# Patient Record
Sex: FEMALE | Race: WHITE
[De-identification: names, ages, dates, MRNs, and addresses within clinical notes are randomized per-mention and may not be internally consistent; named-entity substitution may affect disease eponyms.]

---

## 2024-06-25 ENCOUNTER — HOSPITAL ENCOUNTER (OUTPATIENT)
Dept: PHYSICAL THERAPY | Facility: CLINIC | Age: 44
Setting detail: THERAPIES SERIES
Discharge: HOME | End: 2024-06-25
Payer: COMMERCIAL

## 2024-06-25 PROCEDURE — 97161 PT EVAL LOW COMPLEX 20 MIN: CPT

## 2024-06-25 PROCEDURE — 97530 THERAPEUTIC ACTIVITIES: CPT

## 2024-06-25 NOTE — CONSULTS
incontinence  -Educated patient on stress urinary incontinence  -Educated patient on how to complete pelvic floor activation    -Educated patient on the knack technique      Specific Instructions for next treatment: Progress PF relaxation and strength, Initiated core stability ex    Evaluation Complexity:  History (Personal factors, comorbidities) [x] 0 [] 1-2 [] 3+   Exam (limitations, restrictions) [x] 1-2 [] 3 [] 4+   Clinical presentation (progression) [x] Stable [] Evolving  [] Unstable   Decision Making [x] Low [] Moderate [] High    [x] Low Complexity [] Moderate Complexity [] High Complexity       Treatment Charges: Mins Units   [x] Evaluation       [x]  Low       []  Moderate       []  High 34 1   []  Modalities:     []  Ther Exercise     []  Manual Therapy     [x]  Ther Activities 15 1   []  Aquatics     [] Vasocompression     []  Other       TOTAL TREATMENT TIME: 49 min      Time in: 11:11 am      Time out: 12:00 pm       Electronically signed by: Paola Schmitt PT      Physician Signature:________________________________Date:__________________  By signing above or cosigning this note, I have reviewed this plan of care and certify a need for medically necessary rehabilitation services.     *PLEASE SIGN ABOVE AND FAX BACK ALL PAGES*

## 2024-07-03 ENCOUNTER — HOSPITAL ENCOUNTER (OUTPATIENT)
Dept: PHYSICAL THERAPY | Facility: CLINIC | Age: 44
Setting detail: THERAPIES SERIES
Discharge: HOME | End: 2024-07-03
Payer: COMMERCIAL

## 2024-07-03 PROCEDURE — 97110 THERAPEUTIC EXERCISES: CPT

## 2024-07-03 PROCEDURE — 97530 THERAPEUTIC ACTIVITIES: CPT

## 2024-07-03 NOTE — FLOWSHEET NOTE
[] [facility]  Outpatient Rehabilitation &  Therapy  [address]  [phone]  [phone] [] [facility]  Outpatient Rehabilitation &  Therapy  [address]  P: [phone]  F: [phone] [x] [facility]  Outpatient Rehabilitation &  Therapy  [address]  P: [phone]  F: [phone] [] [facility]  Outpatient Rehabilitation &  Therapy  [address]  [phone]  [phone] [] [facility]  Outpatient Rehabilitation &  Therapy  [address]   P: [phone]  F: [phone]  [] [facility]  Outpatient Rehabilitation &  Therapy  [address]  P: [phone]  F: [phone] [] [facility]  Outpatient Rehabilitation &  Therapy  [address]  P: [phone]  F: [phone] [] [facility]  Outpatient Rehabilitation &  Therapy  [address]  P: [phone]  F: [phone] [] [facility]  Outpatient Rehabilitation &  Therapy  [address]  P: [phone]  F: [phone]  [] [facility] Outpatient Rehabilitation &  Therapy  [address]  P: [phone]  F: [phone]     Physical Therapy Daily Treatment Note    Date:  7/3/2024  Patient Name:  Mary Benson    :  1980  MRN: [id number]  Physician: Lee Ann Michelle MD                      Insurance:  [facility] [employer]: [facility]  vs hard max, not combined; Copay $50/ vs; no ded or coins; oop 9400/ 9352 remain, AUTH AFTER EVAL    Approved visits valid: 24-9/15/24  CPT Codes: 91235, 96552, 13493 1X/wk (10visits/40units); Auth#[id number]      Medical Diagnosis: N39.3 (ICD-10-CM) - Stress incontinence (female) (male)                             Rehab Codes: N81.84, R27.8, M62.81, M62.08, N39.3    Onset Date:  24 (script)

## 2024-07-10 ENCOUNTER — HOSPITAL ENCOUNTER (OUTPATIENT)
Dept: PHYSICAL THERAPY | Facility: CLINIC | Age: 44
Setting detail: THERAPIES SERIES
Discharge: HOME | End: 2024-07-10
Payer: COMMERCIAL

## 2024-07-10 PROCEDURE — 97110 THERAPEUTIC EXERCISES: CPT

## 2024-07-10 NOTE — FLOWSHEET NOTE
Next Dr.'s appt: PRN  Visit# / total visits: 3/10;      Cancels/No Shows: 0/0    Subjective:    Pain:  [] Yes  [x] No Location:  N/A Pain Rating: (0-10 scale) 0/10  Pain altered Tx:  [x] No  [] Yes  Action:  Comments: Patient reports to physical therapy this date stating that she has been practicing pelvic floor activation at home. States that she has noticed a reduction in ARMANDO with sneezing and coughing because she has been completing the knack technique.     Objective:  Modalities:   Precautions:  Exercises:  Exercise Reps/ Time Weight/ Level Comments   Piriformis S  30\"x3       Hip flexor S  30\"x3                 Bridges   2x10  lime     Clamshells  2x10  lime     SL hip abduction   2x10  lime               TA set   3\"x15       PF endurance holds  3\"x15       PF quick flicks   1\"x15        PF LAQ  x10ea       PF ball squeeze 3\"x15     PF STS  x10                 Other:     Treatment Charges: Mins Units   []  Modalities     [x]  Ther Exercise 45 3   []  Manual Therapy     []  Ther Activities     []  Neuro Re-ed     []  Vasocompression     [] Gait     []  Other     Total Billable time 45 3       Assessment: [x] Progressing toward goals. Initiated session with stretches in order to progress mobility. Started strengthening with pelvic floor strengthening ex per patient preference in order to reduce ARMANDO symptoms. Initiated seated pelvic floor strengthening this date. VC throughout to improve breathing technique and reduce glute activation with good follow through. Patient demonstrates increase glute activation with STS this date, however patient able to reduce co-activation with increased reps. Ended session with TA and hip strengthening to improve support around pelvic floor.     [] No change.     [] Other:  [x] Patient would continue to benefit from skilled physical therapy services in order to: improve  diastasis recti, pelvic floor strength, pelvic floor coordination and (B) LE strength     STG: (to be met

## 2024-07-16 ENCOUNTER — HOSPITAL ENCOUNTER (OUTPATIENT)
Dept: PHYSICAL THERAPY | Facility: CLINIC | Age: 44
Setting detail: THERAPIES SERIES
Discharge: HOME | End: 2024-07-16
Payer: COMMERCIAL

## 2024-07-16 PROCEDURE — 97110 THERAPEUTIC EXERCISES: CPT

## 2024-07-16 NOTE — FLOWSHEET NOTE
benefit from skilled physical therapy services in order to: improve  diastasis recti, pelvic floor strength, pelvic floor coordination and (B) LE strength     STG: (to be met in 4 treatments)  ? Strength: Patient will demonstrate proper TA/PF activation in supine with correct breathing in order to improve core/pelvic floor stability  ? Function: Patient will demonstrate 1 cm perineal body descent without use of accessory muscles in order to improve coordination of the pelvic floor   Patient will improve diastasis recti to 0 finger widths in order to improve co-contraction of pelvic floor and overall stability   Independent with Home Exercise Programs  LTG: (to be met in 8 treatments)  Patient will improve DAKOTA-6 to 0% in order to indicate improved overall quality of life   Patient will improve (B) LE strength to 5/5 in order to improve stability to pelvis    Patient will demonstrate ability to lift up to 25lbs with good core/pelvic floor stability and good breathing technique in order to increase ease of performing care taking tasks   Patient will report reduced ARMANDO symptoms with jumping and running with use of knack technique     Pt. Education:  [x] Yes  [] No  [x] Reviewed Prior HEP/Ed  Method of Education: [x] Verbal  [x] Demo  [] Written  Access Code: [email or web address]  URL: [email or web address]  Date: 07/16/2024  Prepared by: Paola Montgomery    Exercises  - Supine Piriformis Stretch with Foot on Ground  - 2 x daily - 7 x weekly - 3 sets - 30 sec hold  - Modified Artie Stretch  - 2 x daily - 7 x weekly - 3 sets - 30 sec hold  - Supine Bridge  - 2 x daily - 7 x weekly - 2 sets - 10 reps  - Clamshell  - 2 x daily - 7 x weekly - 2 sets - 10 reps  - Sidelying Hip Abduction  - 2 x daily - 7 x weekly - 2 sets - 10 reps  - Supine Transversus Abdominis Bracing - Hands on Stomach  - 2 x daily - 7 x weekly - 2 sets - 10 reps - 5 sec hold  - Seated Pelvic Floor Contraction  - 2 x daily - 7 x weekly - 1 sets - 15 reps - 3 sec

## 2024-07-26 ENCOUNTER — HOSPITAL ENCOUNTER (OUTPATIENT)
Dept: PHYSICAL THERAPY | Facility: CLINIC | Age: 44
Setting detail: THERAPIES SERIES
Discharge: HOME | End: 2024-07-26
Payer: COMMERCIAL

## 2024-07-26 PROCEDURE — 97110 THERAPEUTIC EXERCISES: CPT

## 2024-07-26 NOTE — FLOWSHEET NOTE
[] [facility]  Outpatient Rehabilitation &  Therapy  [address]  [phone]  [phone] [] [facility]  Outpatient Rehabilitation &  Therapy  [address]  P: [phone]  F: [phone] [x] [facility]  Outpatient Rehabilitation &  Therapy  [address]  P: [phone]  F: [phone] [] [facility]  Outpatient Rehabilitation &  Therapy  [address]  [phone]  [phone] [] [facility]  Outpatient Rehabilitation &  Therapy  [address]   P: [phone]  F: [phone]  [] [facility]  Outpatient Rehabilitation &  Therapy  [address]  P: [phone]  F: [phone] [] [facility]  Outpatient Rehabilitation &  Therapy  [address]  P: [phone]  F: [phone] [] [facility]  Outpatient Rehabilitation &  Therapy  [address]  P: [phone]  F: [phone] [] [facility]  Outpatient Rehabilitation &  Therapy  [address]  P: [phone]  F: [phone]  [] [facility] Outpatient Rehabilitation &  Therapy  [address]  P: [phone]  F: [phone]     Physical Therapy Daily Treatment Note    Date:  2024  Patient Name:  Mary Benson    :  1980  MRN: [id number]  Physician: Lee Ann Michelle MD                      Insurance:  [facility] Market[facility]: [facility]  vs hard max, not combined; Copay $50/ vs; no ded or coins; oop 9400/ 9357 remain, AUTH AFTER EVAL    Approved visits valid: 24-9/15/24  CPT Codes: 73763, 31083, 32167 1X/wk (10visits/40units); Auth#[id number]      Medical Diagnosis: N39.3 (ICD-10-CM) - Stress incontinence (female) (male)                             Rehab Codes: N81.84, R27.8, M62.81, M62.08, N39.3    Onset Date:  24 (script)

## 2024-08-06 ENCOUNTER — HOSPITAL ENCOUNTER (OUTPATIENT)
Dept: PHYSICAL THERAPY | Facility: CLINIC | Age: 44
Setting detail: THERAPIES SERIES
Discharge: HOME | End: 2024-08-06
Payer: COMMERCIAL

## 2024-08-06 PROCEDURE — 97110 THERAPEUTIC EXERCISES: CPT

## 2024-08-06 NOTE — FLOWSHEET NOTE
[] [facility]  Outpatient Rehabilitation &  Therapy  [address]  [phone]  [phone] [] [facility]  Outpatient Rehabilitation &  Therapy  [address]  P: [phone]  F: [phone] [x] [facility]  Outpatient Rehabilitation &  Therapy  [address]  P: [phone]  F: [phone] [] [facility]  Outpatient Rehabilitation &  Therapy  [address]  [phone]  [phone] [] [facility]  Outpatient Rehabilitation &  Therapy  [address]   P: [phone]  F: [phone]  [] [facility]  Outpatient Rehabilitation &  Therapy  [address]  P: [phone]  F: [phone] [] [facility]  Outpatient Rehabilitation &  Therapy  [address]  P: [phone]  F: [phone] [] [facility]  Outpatient Rehabilitation &  Therapy  [address]  P: [phone]  F: [phone] [] [facility]  Outpatient Rehabilitation &  Therapy  [address]  P: [phone]  F: [phone]  [] [facility] Outpatient Rehabilitation &  Therapy  [address]  P: [phone]  F: [phone]     Physical Therapy Daily Treatment Note    Date:  2024  Patient Name:  Mary Benson    :  1980  MRN: [id number]  Physician: Lee Ann Michelle MD                      Insurance:  [facility] Marketplace: [facility]  vs hard max, not combined; Copay $50/ vs; no ded or coins; oop 9400/ 9307 remain, AUTH AFTER EVAL    Approved visits valid: 24-9/15/24  CPT Codes: 68045, 44053, 83914 1X/wk (10visits/40units); Auth#[id number]      Medical Diagnosis: N39.3 (ICD-10-CM) - Stress incontinence (female) (male)                             Rehab Codes: N81.84, R27.8, M62.81, M62.08, N39.3    Onset Date:  24 (script)

## 2024-08-21 ENCOUNTER — HOSPITAL ENCOUNTER (OUTPATIENT)
Dept: PHYSICAL THERAPY | Facility: CLINIC | Age: 44
Setting detail: THERAPIES SERIES
Discharge: HOME | End: 2024-08-21
Payer: COMMERCIAL

## 2024-08-21 PROCEDURE — 97110 THERAPEUTIC EXERCISES: CPT

## 2024-08-21 NOTE — FLOWSHEET NOTE
Next Dr.'s appt: PRN  Visit# / total visits: 7/10;      Cancels/No Shows: 0/0    Subjective:    Pain:  [] Yes  [x] No Location:  N/A Pain Rating: (0-10 scale) 0/10  Pain altered Tx:  [x] No  [] Yes  Action:  Comments: Patient reports to physical therapy this date stating that she cont to see improvements since previous session. States that she has been completing ex regularly and has noticed that she has better control over pelvic floor with sneezing and coughing. Was able to swing a bat with less leakage than previously. States that she did have larger leakage yesterday following a sneeze when her bladder was full.     Objective:  Modalities:   Precautions: Pelvic Floor Ex First  Exercises:  Exercise Reps/ Time Weight/ Level Comments    Piriformis S  30\"x3     x   Hip flexor S  30\"x3     x              Bridges   3x10 blue      Clamshells  3x10  lime      SL hip abduction   3x10  lime                 TA set   3\"x15        PF endurance holds  3\"x15        PF quick flicks   1\"x15         PF LAQ  x10ea        PF ball squeeze 3\"x15      PF marches x10ea      PF STS  x10   x   PF endurance holds 3\"x15   x   PF quick flicks  1\"x15   x   PF fwd steps x20   x   PF lat steps x20   x   PF squats  x10   x   PF step ups  x10   x   Other:     Treatment Charges: Mins Units   []  Modalities     [x]  Ther Exercise 45 3   []  Manual Therapy     [x]  Ther Activities 5 -   []  Neuro Re-ed     []  Vasocompression     [] Gait     []  Other     Total Billable time 50 3       Assessment: [x] Progressing toward goals. Initiated session this date with pelvic floor activation ex with use of BFB device to improve activation of the pelvic floor and assist patient in understanding if she is completing pelvic floor activation correctly. Patient cont to demonstrate good initial contraction of pelvic floor, however with increased reps and ex, patient demonstrates increased fatigue and decrease pelvic floor activation. VC to provide ample

## 2024-09-04 ENCOUNTER — HOSPITAL ENCOUNTER (OUTPATIENT)
Dept: PHYSICAL THERAPY | Facility: CLINIC | Age: 44
Setting detail: THERAPIES SERIES
Discharge: HOME | End: 2024-09-04
Payer: COMMERCIAL

## 2024-09-04 PROCEDURE — 97110 THERAPEUTIC EXERCISES: CPT

## 2024-09-04 NOTE — FLOWSHEET NOTE
[] [facility]  Outpatient Rehabilitation &  Therapy  [address]  [phone]  [phone] [] [facility]  Outpatient Rehabilitation &  Therapy  [address]  P: [phone]  F: [phone] [x] [facility]  Outpatient Rehabilitation &  Therapy  [address]  P: [phone]  F: [phone] [] [facility]  Outpatient Rehabilitation &  Therapy  [address]  [phone]  [phone] [] [facility]  Outpatient Rehabilitation &  Therapy  [address]   P: [phone]  F: [phone]  [] [facility]  Outpatient Rehabilitation &  Therapy  [address]  P: [phone]  F: [phone] [] [facility]  Outpatient Rehabilitation &  Therapy  [address]  P: [phone]  F: [phone] [] [facility]  Outpatient Rehabilitation &  Therapy  [address]  P: [phone]  F: [phone] [] [facility]  Outpatient Rehabilitation &  Therapy  [address]  P: [phone]  F: [phone]  [] [facility] Outpatient Rehabilitation &  Therapy  [address]  P: [phone]  F: [phone]     Physical Therapy Daily Treatment Note    Date:  2024  Patient Name:  Mary Benson    :  1980  MRN: [id number]  Physician: Lee Ann Michelle MD                      Insurance:  [facility]: [facility]  vs hard max, not combined; Copay $50/ vs; no ded or coins; oop 9400/ 9335 remain, AUTH AFTER EVAL    Approved visits valid: 24-9/15/24   CPT Codes: 63616, 94463, 00471, 96884 (lacy'd ); 1X/wk (10visits/40units)       Medical Diagnosis: N39.3 (ICD-10-CM) - Stress incontinence (female) (male)                             Rehab Codes: N81.84, R27.8, M62.81, M62.08, N39.3    Onset Date:  24 (script)

## 2024-09-27 ENCOUNTER — HOSPITAL ENCOUNTER (OUTPATIENT)
Dept: PHYSICAL THERAPY | Facility: CLINIC | Age: 44
Setting detail: THERAPIES SERIES
Discharge: HOME | End: 2024-09-27
Payer: COMMERCIAL

## 2024-09-27 PROCEDURE — 97110 THERAPEUTIC EXERCISES: CPT

## 2024-10-09 ENCOUNTER — HOSPITAL ENCOUNTER (OUTPATIENT)
Dept: PHYSICAL THERAPY | Facility: CLINIC | Age: 44
Setting detail: THERAPIES SERIES
Discharge: HOME | End: 2024-10-09
Payer: COMMERCIAL

## 2024-10-09 PROCEDURE — 97530 THERAPEUTIC ACTIVITIES: CPT

## 2024-10-09 PROCEDURE — G0283 ELEC STIM OTHER THAN WOUND: HCPCS

## 2024-10-09 NOTE — PROGRESS NOTES
to lift up to 25lbs with good core/pelvic floor stability and good breathing technique in order to increase ease of performing care taking tasks (MET)   Patient will report reduced ARMANDO symptoms with jumping and running with use of knack technique Patient reports that her urinary incontinence happens very infrequently mostly with sneezes and straddle jumping, however patient reports that she has been working on ex to prevent leakage. Does feel she can hold her bladder longer. (ONGOING)     Pt. Education:  [x] Yes  [] No  [x] Reviewed Prior HEP/Ed  Method of Education: [x] Verbal  [x] Demo  [] Written  Access Code: [email or web address]  URL: [email or web address]  Date: 09/27/2024  Prepared by: Paola Montgomery    Exercises  - Supine Piriformis Stretch with Foot on Ground  - 1 x daily - 7 x weekly - 3 sets - 30 sec hold  - Modified Artie Stretch  - 1 x daily - 7 x weekly - 3 sets - 30 sec hold  - Supine Bridge  - 1 x daily - 7 x weekly - 3 sets - 10 reps  - Clamshell  - 1 x daily - 7 x weekly - 3 sets - 10 reps  - Sidelying Hip Abduction  - 1 x daily - 7 x weekly - 3 sets - 10 reps  - Sit to Stand with Pelvic Floor Contraction  - 1 x daily - 7 x weekly - 1 sets - 10 reps  - Standing Pelvic Floor Contraction  - 1 x daily - 7 x weekly - 1 sets - 15 reps - 3 sec hold  - Lunge with Pelvic Floor Contraction  - 1 x daily - 7 x weekly - 1 sets - 10 reps  - Standing Side Lunge With Pelvic Floor Contraction  - 1 x daily - 7 x weekly - 1 sets - 10 reps  - Mini Squat with Pelvic Floor Contraction  - 1 x daily - 7 x weekly - 1 sets - 10 reps  - Squat to Heel Raise  - 1 x daily - 7 x weekly - 2 sets - 10 reps  - Step Up with Pelvic Floor Contraction  - 1 x daily - 7 x weekly - 1 sets - 10 reps  - Jumping with Pelvic Floor Contraction  - 1 x daily - 7 x weekly - 1 sets - 10 reps  - Jumping Jacks  - 1 x daily - 7 x weekly - 1 sets - 10 reps  - Standing Hip Flexion with Resistance Loop  - 1 x daily - 7 x weekly - 1 sets - 20 reps  - Hip

## 2024-10-23 ENCOUNTER — HOSPITAL ENCOUNTER (OUTPATIENT)
Dept: PHYSICAL THERAPY | Facility: CLINIC | Age: 44
Setting detail: THERAPIES SERIES
Discharge: HOME | End: 2024-10-23
Payer: COMMERCIAL

## 2024-10-23 PROCEDURE — 97112 NEUROMUSCULAR REEDUCATION: CPT

## 2024-10-23 PROCEDURE — G0283 ELEC STIM OTHER THAN WOUND: HCPCS

## 2024-10-23 NOTE — FLOWSHEET NOTE
[] [facility]  Outpatient Rehabilitation &  Therapy  [address]  [phone]  [phone] [] [facility]  Outpatient Rehabilitation &  Therapy  [address]  P: [phone]  F: [phone] [x] [facility]  Outpatient Rehabilitation &  Therapy  [address]  P: [phone]  F: [phone] [] [facility]  Outpatient Rehabilitation &  Therapy  [address]  [phone]  [phone] [] [facility]  Outpatient Rehabilitation &  Therapy  [address]   P: [phone]  F: [phone]  [] [facility]  Outpatient Rehabilitation &  Therapy  [address]  P: [phone]  F: [phone] [] [facility]  Outpatient Rehabilitation &  Therapy  [address]  P: [phone]  F: [phone] [] [facility]  Outpatient Rehabilitation &  Therapy  [address]  P: [phone]  F: [phone] [] [facility]  Outpatient Rehabilitation &  Therapy  [address]  P: [phone]  F: [phone]  [] [facility] Outpatient Rehabilitation &  Therapy  [address]  P: [phone]  F: [phone]     Physical Therapy Daily Treatment Note    Date:  10/23/2024  Patient Name:  Mary Benson    :  1980  MRN: [id number]  Physician: Lee Ann Michelle MD                      Insurance:  [facility] Marketplace: [facility]  vs hard max, not combined; Copay $50/ vs; no ded or coins; oop 9400/ 9360 remain, AUTH AFTER EVAL    Approved visits valid: 24-9/15/24   CPT Codes: 20960, 95556, 29852, 17816 (lacy'd ); 1X/wk (10visits/40units)      [facility] valid 24 - 10/31/24; CPT Codes: 32446, 73398, 39389, 41244; Auth #:[id number]      Medical Diagnosis: N39.3 (ICD-10-CM) - Stress incontinence (female) (male)

## 2024-11-06 ENCOUNTER — HOSPITAL ENCOUNTER (OUTPATIENT)
Dept: PHYSICAL THERAPY | Facility: CLINIC | Age: 44
Setting detail: THERAPIES SERIES
Discharge: HOME | End: 2024-11-06
Payer: COMMERCIAL

## 2024-11-06 PROCEDURE — 97140 MANUAL THERAPY 1/> REGIONS: CPT

## 2024-11-06 PROCEDURE — 97110 THERAPEUTIC EXERCISES: CPT

## 2024-11-06 NOTE — FLOWSHEET NOTE
overall stability (MET)   Independent with Home Exercise Programs (MET)   LTG: (to be met in 8 treatments)  Patient will improve DAKOTA-6 to 0% in order to indicate improved overall quality of life Patient reports DAKOTA-6: 8.33% (ONGOING)   Patient will improve (B) LE strength to 5/5 in order to improve stability to pelvis (MET)    Patient will demonstrate ability to lift up to 25lbs with good core/pelvic floor stability and good breathing technique in order to increase ease of performing care taking tasks (MET)   Patient will report reduced ARMANDO symptoms with jumping and running with use of knack technique Patient reports that her urinary incontinence happens very infrequently mostly with sneezes and straddle jumping, however patient reports that she has been working on ex to prevent leakage. Does feel she can hold her bladder longer. (ONGOING)     Pt. Education:  [x] Yes  [] No  [x] Reviewed Prior HEP/Ed  Method of Education: [x] Verbal  [x] Demo  [] Written  Access Code: [email or web address]  URL: [email or web address]  Date: 11/06/2024  Prepared by: Paola Montgomery    Exercises  - Supine Butterfly Groin Stretch  - 2 x daily - 7 x weekly - 3 sets - 30 sec hold  - Supine Pelvic Floor Stretch  - 2 x daily - 7 x weekly - 3 sets - 30 sec hold  - Supine Piriformis Stretch with Foot on Ground  - 1 x daily - 7 x weekly - 3 sets - 30 sec hold  - Modified Artie Stretch  - 1 x daily - 7 x weekly - 3 sets - 30 sec hold  - Seated Pelvic Floor Elevators With Lengthening  - 1 x daily - 7 x weekly - 1 sets - 10 reps  - Standing Pelvic Floor Contraction  - 1 x daily - 7 x weekly - 1 sets - 15 reps - 3 sec hold  - Jumping Jacks  - 1 x daily - 7 x weekly - 1 sets - 10 reps  - Lateral Single Leg Lunge Jumps  - 1 x daily - 7 x weekly - 1 sets - 10 reps  - Skip with High Knees  - 1 x daily - 7 x weekly - 1 sets - 10 reps  - Forward Single Leg Jumps  - 1 x daily - 7 x weekly - 1 sets - 10 reps  Comprehension of Education:  [x] Verbalizes

## 2024-11-20 ENCOUNTER — HOSPITAL ENCOUNTER (OUTPATIENT)
Dept: PHYSICAL THERAPY | Facility: CLINIC | Age: 44
Setting detail: THERAPIES SERIES
Discharge: HOME | End: 2024-11-20
Payer: COMMERCIAL

## 2024-11-20 PROCEDURE — 97530 THERAPEUTIC ACTIVITIES: CPT

## 2024-11-20 PROCEDURE — 97110 THERAPEUTIC EXERCISES: CPT

## 2024-11-20 NOTE — FLOWSHEET NOTE
Education:  [x] Verbalizes understanding.  [x] Demonstrates understanding.  [x] Needs review.  [] Demonstrates/verbalizes HEP/Ed previously given.     Plan: [x] Patient to be placed on hold until the end of 2024. Will be discharged if no further intervention is required         Time In: 8:05 am            Time Out: 8:56 am     Electronically signed by:  Paola Schmitt, PT

## 2024-12-03 ENCOUNTER — CLINICAL DOCUMENTATION (OUTPATIENT)
Dept: PHYSICAL THERAPY | Facility: CLINIC | Age: 44
End: 2024-12-03

## 2024-12-03 NOTE — FLOWSHEET NOTE
Diagnosis: N39.3 (ICD-10-CM) - Stress incontinence (female) (male)                             Rehab Codes: N81.84, R27.8, M62.81, M62.08, N39.3    Onset Date:  6/13/24 (script)                        Next Dr.'s appt: PRN  Visit# / total visits: 13/14;   Date of initial visit: 6/25/24                Date of final visit: 11/20/24      Discharge Status:     [x] Pt recovered from conditions. Treatment goals were met.    [] Pt received maximum benefit. No further therapy indicated at this time.    [x] Pt to continue exercise/home instructions independently.    [] Therapy interrupted due to:    [] Pt has 2 or more no shows/cancels, is discontinued per our policy.    [] Pt has completed prescribed number of treatment sessions.    [] Other:         Electronically signed by Radha Hilton PTA on 12/3/2024 at 3:21 PM      If you have any questions or concerns, please don't hesitate to call.  Thank you for your referral.

## 2025-04-04 ENCOUNTER — HOSPITAL ENCOUNTER (EMERGENCY)
Age: 45
Discharge: HOME | End: 2025-04-04
Attending: EMERGENCY MEDICINE
Payer: COMMERCIAL

## 2025-04-04 ENCOUNTER — APPOINTMENT (OUTPATIENT)
Dept: CT IMAGING | Age: 45
End: 2025-04-04
Payer: COMMERCIAL

## 2025-04-04 VITALS
SYSTOLIC BLOOD PRESSURE: 90 MMHG | DIASTOLIC BLOOD PRESSURE: 57 MMHG | TEMPERATURE: 99.1 F | BODY MASS INDEX: 22.45 KG/M2 | HEART RATE: 84 BPM | WEIGHT: 122 LBS | OXYGEN SATURATION: 97 % | HEIGHT: 62 IN | RESPIRATION RATE: 16 BRPM

## 2025-04-04 DIAGNOSIS — R10.33 PERIUMBILICAL ABDOMINAL PAIN: Primary | ICD-10-CM

## 2025-04-04 DIAGNOSIS — R19.7 NAUSEA VOMITING AND DIARRHEA: ICD-10-CM

## 2025-04-04 DIAGNOSIS — R11.2 NAUSEA VOMITING AND DIARRHEA: ICD-10-CM

## 2025-04-04 LAB
ALBUMIN SERPL-MCNC: 4.3 G/DL (ref 3.5–5.2)
ALBUMIN/GLOB SERPL: 1.5 {RATIO} (ref 1–2.5)
ALP SERPL-CCNC: 59 U/L (ref 35–104)
ALT SERPL-CCNC: 13 U/L (ref 5–33)
ANION GAP SERPL CALCULATED.3IONS-SCNC: 11 MMOL/L (ref 9–17)
AST SERPL-CCNC: 16 U/L
BASOPHILS # BLD: 0 K/UL (ref 0–0.2)
BASOPHILS NFR BLD: 0 % (ref 0–2)
BILIRUB SERPL-MCNC: 0.7 MG/DL (ref 0.3–1.2)
BILIRUB UR QL STRIP: NEGATIVE
BUN SERPL-MCNC: 15 MG/DL (ref 6–20)
CALCIUM SERPL-MCNC: 9 MG/DL (ref 8.6–10.4)
CHLORIDE SERPL-SCNC: 103 MMOL/L (ref 98–107)
CLARITY UR: CLEAR
CO2 SERPL-SCNC: 26 MMOL/L (ref 20–31)
COLOR UR: YELLOW
COMMENT: ABNORMAL
CREAT SERPL-MCNC: 0.6 MG/DL (ref 0.5–0.9)
EOSINOPHIL # BLD: 0 K/UL (ref 0–0.4)
EOSINOPHILS RELATIVE PERCENT: 0 % (ref 1–4)
ERYTHROCYTE [DISTWIDTH] IN BLOOD BY AUTOMATED COUNT: 12.1 % (ref 12.5–15.4)
GFR, ESTIMATED: >90 ML/MIN/1.73M2
GLUCOSE SERPL-MCNC: 103 MG/DL (ref 70–99)
GLUCOSE UR STRIP-MCNC: NEGATIVE MG/DL
HCG SERPL QL: NEGATIVE
HCT VFR BLD AUTO: 44.5 % (ref 36–46)
HGB BLD-MCNC: 15.8 G/DL (ref 12–16)
HGB UR QL STRIP.AUTO: NEGATIVE
KETONES UR STRIP-MCNC: ABNORMAL MG/DL
LEUKOCYTE ESTERASE UR QL STRIP: NEGATIVE
LIPASE SERPL-CCNC: 26 U/L (ref 13–60)
LYMPHOCYTES NFR BLD: 0.59 K/UL (ref 1–4.8)
LYMPHOCYTES RELATIVE PERCENT: 7 % (ref 24–44)
MCH RBC QN AUTO: 31.6 PG (ref 26–34)
MCHC RBC AUTO-ENTMCNC: 35.4 G/DL (ref 31–37)
MCV RBC AUTO: 89.1 FL (ref 80–100)
MONOCYTES NFR BLD: 0.42 K/UL (ref 0.1–0.8)
MONOCYTES NFR BLD: 5 % (ref 1–7)
MORPHOLOGY: NORMAL
NEUTROPHILS NFR BLD: 88 % (ref 36–66)
NEUTS SEG NFR BLD: 7.39 K/UL (ref 1.8–7.7)
NITRITE UR QL STRIP: NEGATIVE
PH UR STRIP: 6 [PH] (ref 5–8)
PLATELET # BLD AUTO: 224 K/UL (ref 140–450)
PMV BLD AUTO: 8.2 FL (ref 6–12)
POTASSIUM SERPL-SCNC: 4.1 MMOL/L (ref 3.7–5.3)
PROT SERPL-MCNC: 7.2 G/DL (ref 6.4–8.3)
PROT UR STRIP-MCNC: NEGATIVE MG/DL
RBC # BLD AUTO: 4.99 M/UL (ref 4–5.2)
SODIUM SERPL-SCNC: 140 MMOL/L (ref 135–144)
SP GR UR STRIP: 1.09 (ref 1–1.03)
UROBILINOGEN UR STRIP-ACNC: NORMAL EU/DL (ref 0–1)
WBC OTHER # BLD: 8.4 K/UL (ref 3.5–11)

## 2025-04-04 PROCEDURE — 96375 TX/PRO/DX INJ NEW DRUG ADDON: CPT

## 2025-04-04 PROCEDURE — 6360000004 HC RX CONTRAST MEDICATION: Performed by: NURSE PRACTITIONER

## 2025-04-04 PROCEDURE — 6360000002 HC RX W HCPCS: Performed by: NURSE PRACTITIONER

## 2025-04-04 PROCEDURE — 74177 CT ABD & PELVIS W/CONTRAST: CPT

## 2025-04-04 PROCEDURE — 2500000003 HC RX 250 WO HCPCS: Performed by: NURSE PRACTITIONER

## 2025-04-04 PROCEDURE — 84703 CHORIONIC GONADOTROPIN ASSAY: CPT

## 2025-04-04 PROCEDURE — 96374 THER/PROPH/DIAG INJ IV PUSH: CPT

## 2025-04-04 PROCEDURE — 83690 ASSAY OF LIPASE: CPT

## 2025-04-04 PROCEDURE — 2580000003 HC RX 258: Performed by: NURSE PRACTITIONER

## 2025-04-04 PROCEDURE — 80053 COMPREHEN METABOLIC PANEL: CPT

## 2025-04-04 PROCEDURE — 85025 COMPLETE CBC W/AUTO DIFF WBC: CPT

## 2025-04-04 PROCEDURE — 81003 URINALYSIS AUTO W/O SCOPE: CPT

## 2025-04-04 PROCEDURE — 99285 EMERGENCY DEPT VISIT HI MDM: CPT

## 2025-04-04 RX ORDER — ONDANSETRON 2 MG/ML
4 INJECTION INTRAMUSCULAR; INTRAVENOUS ONCE
Status: COMPLETED | OUTPATIENT
Start: 2025-04-04 | End: 2025-04-04

## 2025-04-04 RX ORDER — IOPAMIDOL 755 MG/ML
75 INJECTION, SOLUTION INTRAVASCULAR
Status: COMPLETED | OUTPATIENT
Start: 2025-04-04 | End: 2025-04-04

## 2025-04-04 RX ORDER — 0.9 % SODIUM CHLORIDE 0.9 %
80 INTRAVENOUS SOLUTION INTRAVENOUS ONCE
Status: DISCONTINUED | OUTPATIENT
Start: 2025-04-04 | End: 2025-04-04 | Stop reason: HOSPADM

## 2025-04-04 RX ORDER — SODIUM CHLORIDE 0.9 % (FLUSH) 0.9 %
10 SYRINGE (ML) INJECTION PRN
Status: DISCONTINUED | OUTPATIENT
Start: 2025-04-04 | End: 2025-04-04 | Stop reason: HOSPADM

## 2025-04-04 RX ORDER — 0.9 % SODIUM CHLORIDE 0.9 %
1000 INTRAVENOUS SOLUTION INTRAVENOUS ONCE
Status: COMPLETED | OUTPATIENT
Start: 2025-04-04 | End: 2025-04-04

## 2025-04-04 RX ORDER — ONDANSETRON 4 MG/1
4 TABLET, ORALLY DISINTEGRATING ORAL 3 TIMES DAILY PRN
Qty: 21 TABLET | Refills: 0 | Status: SHIPPED | OUTPATIENT
Start: 2025-04-04

## 2025-04-04 RX ORDER — KETOROLAC TROMETHAMINE 30 MG/ML
30 INJECTION, SOLUTION INTRAMUSCULAR; INTRAVENOUS ONCE
Status: COMPLETED | OUTPATIENT
Start: 2025-04-04 | End: 2025-04-04

## 2025-04-04 RX ADMIN — Medication 80 ML: at 13:06

## 2025-04-04 RX ADMIN — ONDANSETRON 4 MG: 2 INJECTION, SOLUTION INTRAMUSCULAR; INTRAVENOUS at 12:50

## 2025-04-04 RX ADMIN — SODIUM CHLORIDE 1000 ML: 0.9 INJECTION, SOLUTION INTRAVENOUS at 12:48

## 2025-04-04 RX ADMIN — KETOROLAC TROMETHAMINE 30 MG: 30 INJECTION, SOLUTION INTRAMUSCULAR at 12:50

## 2025-04-04 RX ADMIN — FAMOTIDINE 20 MG: 10 INJECTION, SOLUTION INTRAVENOUS at 12:50

## 2025-04-04 RX ADMIN — IOPAMIDOL 75 ML: 755 INJECTION, SOLUTION INTRAVENOUS at 13:05

## 2025-04-04 RX ADMIN — SODIUM CHLORIDE, PRESERVATIVE FREE 10 ML: 5 INJECTION INTRAVENOUS at 13:05

## 2025-04-04 ASSESSMENT — ENCOUNTER SYMPTOMS
SHORTNESS OF BREATH: 0
ABDOMINAL PAIN: 1
BACK PAIN: 0
VOMITING: 1
NAUSEA: 1
COUGH: 0
DIARRHEA: 1
SORE THROAT: 0

## 2025-04-04 ASSESSMENT — PAIN - FUNCTIONAL ASSESSMENT
PAIN_FUNCTIONAL_ASSESSMENT: ACTIVITIES ARE NOT PREVENTED
PAIN_FUNCTIONAL_ASSESSMENT: 0-10

## 2025-04-04 ASSESSMENT — PAIN SCALES - GENERAL
PAINLEVEL_OUTOF10: 4
PAINLEVEL_OUTOF10: 6

## 2025-04-04 ASSESSMENT — PAIN DESCRIPTION - LOCATION
LOCATION: ABDOMEN
LOCATION: ABDOMEN

## 2025-04-04 ASSESSMENT — PAIN DESCRIPTION - DESCRIPTORS: DESCRIPTORS: CRAMPING

## 2025-04-04 NOTE — ED NOTES
BP 90/57, patient reports that BP is normally low, okay to discharge per Concetta BAER with BP of 90/57

## 2025-04-04 NOTE — ED PROVIDER NOTES
[facility] EMERGENCY DEPARTMENT  EMERGENCY DEPARTMENT ENCOUNTER      Pt Name: Mary Benson  MRN: [id number]  Birthdate 1980  Date of evaluation: 4/4/2025  Provider: GENO Gibbs CNP  3:36 PM    CHIEF COMPLAINT       Chief Complaint   Patient presents with    Abdominal Pain     Couple episodes of vomiting and diarrhea          HISTORY OF PRESENT ILLNESS    Mary Benson is a 44 y.o. female who presents to the emergency department      This is a nontoxic-appearing 44-year-old female presenting to the emergency department via private auto with her spouse, the patient reports that the symptoms started late last night, she has had a total of 2-3 episodes of vomiting, episode of loose stool diarrhea no melena or hematochezia, reports normal bowel movement this morning, the patient has had persisting periumbilical right lower quadrant pain described as both sharp and crampy, the patient and the patient's  were concerned for acute appendicitis prompting the emergency room evaluation; the patient reports that 1 week ago she did have extreme fatigue and upper respiratory-like infection symptoms but had improved from those symptoms and started with the GI symptoms approximately 3:00 this morning.  No alleviating measures have been attempted prior to arrival.  No urinary symptoms.  No abnormal vaginal discharge or bleeding.    The history is provided by the patient and medical records.       Nursing Notes were reviewed.    REVIEW OF SYSTEMS       Review of Systems   Constitutional:  Negative for chills, fatigue and fever.   HENT:  Negative for congestion and sore throat.    Respiratory:  Negative for cough and shortness of breath.    Cardiovascular:  Negative for chest pain and leg swelling.   Gastrointestinal:  Positive for abdominal pain, diarrhea, nausea and vomiting.   Genitourinary:  Negative for dysuria and hematuria.   Musculoskeletal:  Negative for back pain and neck pain.   Skin:  Negative for

## 2025-04-04 NOTE — DISCHARGE INSTRUCTIONS
Zofran as prescribed to help with nausea or vomiting.    Tylenol or ibuprofen as directed over-the-counter to help with pain.    Brat diet to help with diarrhea.    Return to the ER: Fevers not responding to Tylenol or ibuprofen, continued worsening abdominal pain, continued vomiting, weakness, confusion, blood in the vomit or stool; or any other concerning symptoms

## 2025-04-04 NOTE — ED PROVIDER NOTES
[facility] EMERGENCY DEPARTMENT  eMERGENCY dEPARTMENT eNCOUnter   Attending Physician Attestation    Pt Name: Mary Benson  MRN: [id number]  Birthdate 1980  Date of evaluation: 4/4/25        I personally made/approves the management plan for this patient's and take responsibility for the patient management.      (Please note that portions of this note were completed with a voice recognition program.  Efforts were made to edit the dictations but occasionally words are mis-transcribed.)    Flo Rosario DO  Attending Emergency  Physician                Flo Rosario DO  04/04/25 1345